# Patient Record
Sex: FEMALE | Race: WHITE
[De-identification: names, ages, dates, MRNs, and addresses within clinical notes are randomized per-mention and may not be internally consistent; named-entity substitution may affect disease eponyms.]

---

## 2018-12-04 ENCOUNTER — HOSPITAL ENCOUNTER (OUTPATIENT)
Dept: HOSPITAL 92 - BICMAMMO | Age: 63
Discharge: HOME | End: 2018-12-04
Attending: FAMILY MEDICINE
Payer: COMMERCIAL

## 2018-12-04 DIAGNOSIS — Z80.3: ICD-10-CM

## 2018-12-04 DIAGNOSIS — Z12.31: Primary | ICD-10-CM

## 2018-12-04 PROCEDURE — 77067 SCR MAMMO BI INCL CAD: CPT

## 2018-12-04 PROCEDURE — 77063 BREAST TOMOSYNTHESIS BI: CPT

## 2019-06-19 NOTE — CT
CT ABDOMEN AND PELVIS WITH ORAL AND IV CONTRAST:



HISTORY: Diverticulitis



COMPARISON: 11/7/2017



FINDINGS:

Mild dependent changes in the lung bases are again seen. The subcentimeter low-density lesion in the 
left lobe of the liver is stable. No calcified gallstones are seen. The spleen, pancreas, adrenal

glands and right kidney are normal. Small low-density lesions in the left kidney are likely cysts.



No free air, free fluid or lymphadenopathy seen in the abdomen or pelvis. There are vascular calcific
ations without evidence of aneurysmal dilatation of the abdominal aorta. The small bowel loops are

not abnormally dilated. No pericolonic inflammatory changes are seen. There is prominence of the wall
 of the rectosigmoid. There is fecal material in the colon. There are degenerative changes in the

spine.



IMPRESSION: Prominence of the wall of the rectosigmoid may be due to wall thickening or incomplete di
stention. 



Reported By: Mike Feliciano 

Electronically Signed:  6/19/2019 1:27 PM

## 2019-12-27 NOTE — MMO
Bilateral MAMMO Bilat Screen [facility].

 

CLINICAL HISTORY:

Patient is 64 years old and is seen for screening. The patient has no family

history of breast cancer.  The patient has no personal history of cancer. The

patient has a history of right Ultrasound Guided Core Biopsy in 2007 - benign.

 

VIEWS:

The views performed were:  bilateral craniocaudal with tomosynthesis and

bilateral mediolateral oblique with tomosynthesis.

 

FILMS COMPARED:

The present examination has been compared to prior imaging studies performed at

[facility] on 11/27/2017 and 12/04/2018, and at [facility] on

12/18/2015 and 12/20/2016.

 

This study has been interpreted with the assistance of computer-aided detection.

 

MAMMOGRAM FINDINGS:

The breasts are heterogeneously dense, which could obscure a lesion on

mammography.

 

There is an area of architectural distortion seen in the MLO view only seen in

the upper region of the left breast.

 

In the right breast, there are no suspicious masses, calcifications or areas of

architectural distortion.

 

IMPRESSION:

AREA OF ARCHITECTURAL DISTORTION IN THE LEFT BREAST REQUIRES ADDITIONAL

EVALUATION. RECOMMEND DIAGNOSTIC MAMMOGRAM.

 

ULTRASOUND MAY ALSO PROVE USEFUL AT RECALL.

 

THE RESULTS OF THIS EXAM WERE SENT TO THE PATIENT.

 

ACR BI-RADS Category 0 - Incomplete:  Need additional imaging evaluation. [facility] will notify the patient of the need for additional imaging services.

 

MAMMOGRAPHY NOTE:

 1. A negative mammogram report should not delay a biopsy if a dominant of

 clinically suspicious mass is present.

 2. Approximately 10% to 15% of breast cancers are not detected by

 mammography.

 3. Adenosis and dense breasts may obscure an underlying neoplasm.

 

 

Reported by: FRANCES MARTIN MD

Electonically Signed: [id number]

## 2020-01-03 NOTE — MMO
Left Breast MAMMO Unilat Diag [facility].

 

CLINICAL HISTORY:

Patient is 64 years old and is seen for diagnostic exam. The patient has no

family history of breast cancer.  The patient has no personal history of cancer.

The patient has a history of right Ultrasound Guided Core Biopsy in 2007 -

benign.

 

VIEWS:

The views performed were:  left mediolateral oblique spot compression with

tomosynthesis and left mediolateral with tomosynthesis.

 

FILMS COMPARED:

The present examination has been compared to prior imaging studies performed at

[facility] on 11/27/2017, 12/04/2018 and 12/27/2019, and at [facility] on 12/20/2016.

 

This study has been interpreted with the assistance of computer-aided detection.

 

MAMMOGRAM FINDINGS:

The breast is heterogeneously dense, which could obscure a lesion on mammography.

 

Additional evaluation was performed for the area of architectural distortion in

the left breast, upper region seen on 12/27/2019.  On the present examination,

area of architectural distortion in the left breast, upper region does not

persist.

 

There are no suspicious masses, suspicious calcifications, or new areas of

architectural distortion.

 

IMPRESSION:

THERE IS NO MAMMOGRAPHIC EVIDENCE OF MALIGNANCY.

 

THE FINDINGS AND RECOMMENDATIONS WERE DISCUSSED WITH THE PATIENT PRIOR TO HER

LEAVING THE CENTER.

 

A ROUTINE FOLLOW-UP MAMMOGRAM IN 1 YEAR IS RECOMMENDED.

 

THE RESULTS OF THIS EXAM WERE SENT TO THE PATIENT.

 

ACR BI-RADS Category 2 - Benign finding

 

MAMMOGRAPHY NOTE:

 1. A negative mammogram report should not delay a biopsy if a dominant of

 clinically suspicious mass is present.

 2. Approximately 10% to 15% of breast cancers are not detected by

 mammography.

 3. Adenosis and dense breasts may obscure an underlying neoplasm.

 

 

Reported by: NOE BROWER MD

Electonically Signed: [id number]

## 2022-01-27 ENCOUNTER — HOSPITAL ENCOUNTER (OUTPATIENT)
Dept: HOSPITAL 92 - BICMAMMO | Age: 67
Discharge: HOME | End: 2022-01-27
Attending: FAMILY MEDICINE
Payer: MEDICARE

## 2022-01-27 DIAGNOSIS — Z80.3: ICD-10-CM

## 2022-01-27 DIAGNOSIS — Z12.31: Primary | ICD-10-CM

## 2022-01-27 PROCEDURE — 77067 SCR MAMMO BI INCL CAD: CPT

## 2022-01-27 PROCEDURE — 77063 BREAST TOMOSYNTHESIS BI: CPT

## 2022-03-01 ENCOUNTER — HOSPITAL ENCOUNTER (OUTPATIENT)
Dept: HOSPITAL 92 - CSHMAMMO | Age: 67
Discharge: HOME | End: 2022-03-01
Attending: OBSTETRICS & GYNECOLOGY
Payer: MEDICARE

## 2022-03-01 DIAGNOSIS — M85.859: ICD-10-CM

## 2022-03-01 DIAGNOSIS — Z13.820: Primary | ICD-10-CM

## 2022-03-01 PROCEDURE — 77080 DXA BONE DENSITY AXIAL: CPT

## 2022-03-08 ENCOUNTER — HOSPITAL ENCOUNTER (OUTPATIENT)
Dept: HOSPITAL 92 - CSHCT | Age: 67
Discharge: HOME | End: 2022-03-08
Attending: OTOLARYNGOLOGY
Payer: MEDICARE

## 2022-03-08 DIAGNOSIS — J39.2: Primary | ICD-10-CM

## 2022-03-08 LAB — ESTIMATED GFR-MDRD - POC: 84

## 2022-03-08 PROCEDURE — 70491 CT SOFT TISSUE NECK W/DYE: CPT

## 2022-03-08 PROCEDURE — 82565 ASSAY OF CREATININE: CPT

## 2022-06-21 ENCOUNTER — HOSPITAL ENCOUNTER (OUTPATIENT)
Dept: HOSPITAL 92 - BICMRI | Age: 67
Discharge: HOME | End: 2022-06-21
Attending: NEUROLOGICAL SURGERY
Payer: MEDICARE

## 2022-06-21 DIAGNOSIS — M47.16: Primary | ICD-10-CM

## 2022-06-21 DIAGNOSIS — M50.321: ICD-10-CM

## 2022-06-21 DIAGNOSIS — M48.061: ICD-10-CM

## 2022-06-21 PROCEDURE — 72141 MRI NECK SPINE W/O DYE: CPT

## 2022-06-21 PROCEDURE — 72148 MRI LUMBAR SPINE W/O DYE: CPT

## 2023-02-07 ENCOUNTER — HOSPITAL ENCOUNTER (OUTPATIENT)
Dept: HOSPITAL 92 - CSHMAMMO | Age: 68
Discharge: HOME | End: 2023-02-07
Attending: FAMILY MEDICINE
Payer: MEDICARE

## 2023-02-07 DIAGNOSIS — Z91.89: ICD-10-CM

## 2023-02-07 DIAGNOSIS — Z80.3: ICD-10-CM

## 2023-02-07 DIAGNOSIS — R92.1: ICD-10-CM

## 2023-02-07 DIAGNOSIS — Z12.31: Primary | ICD-10-CM

## 2023-02-07 PROCEDURE — 77067 SCR MAMMO BI INCL CAD: CPT

## 2023-02-07 PROCEDURE — 77063 BREAST TOMOSYNTHESIS BI: CPT

## 2023-04-28 ENCOUNTER — HOSPITAL ENCOUNTER (OUTPATIENT)
Dept: HOSPITAL 92 - ERS | Age: 68
Setting detail: OBSERVATION
LOS: 1 days | Discharge: HOME | End: 2023-04-29
Attending: INTERNAL MEDICINE | Admitting: INTERNAL MEDICINE
Payer: MEDICARE

## 2023-04-28 VITALS — BODY MASS INDEX: 22.1 KG/M2

## 2023-04-28 DIAGNOSIS — I08.3: ICD-10-CM

## 2023-04-28 DIAGNOSIS — R42: Primary | ICD-10-CM

## 2023-04-28 DIAGNOSIS — E78.5: ICD-10-CM

## 2023-04-28 DIAGNOSIS — Z79.82: ICD-10-CM

## 2023-04-28 DIAGNOSIS — F10.90: ICD-10-CM

## 2023-04-28 DIAGNOSIS — I49.3: ICD-10-CM

## 2023-04-28 DIAGNOSIS — R53.1: ICD-10-CM

## 2023-04-28 LAB
ALBUMIN SERPL BCG-MCNC: 4.4 G/DL (ref 3.4–4.8)
ALP SERPL-CCNC: 70 U/L (ref 40–110)
ALT SERPL W P-5'-P-CCNC: 25 U/L (ref 8–55)
ANION GAP SERPL CALC-SCNC: 16 MMOL/L (ref 10–20)
APTT PPP: 29.8 SEC (ref 22.9–36.1)
AST SERPL-CCNC: 34 U/L (ref 5–34)
BASOPHILS # BLD AUTO: 0.1 THOU/UL (ref 0–0.2)
BASOPHILS NFR BLD AUTO: 1 % (ref 0–1)
BILIRUB SERPL-MCNC: 0.4 MG/DL (ref 0.2–1.2)
BUN SERPL-MCNC: 16 MG/DL (ref 9.8–20.1)
CALCIUM SERPL-MCNC: 9.5 MG/DL (ref 7.8–10.44)
CHLORIDE SERPL-SCNC: 107 MMOL/L (ref 98–107)
CO2 SERPL-SCNC: 22 MMOL/L (ref 23–31)
CREAT CL PREDICTED SERPL C-G-VRATE: 0 ML/MIN (ref 70–130)
EOSINOPHIL # BLD AUTO: 0.1 THOU/UL (ref 0–0.7)
EOSINOPHIL NFR BLD AUTO: 1 % (ref 0–10)
GLOBULIN SER CALC-MCNC: 2.5 G/DL (ref 2.4–3.5)
GLUCOSE SERPL-MCNC: 101 MG/DL (ref 80–115)
HGB BLD-MCNC: 14.3 G/DL (ref 12–16)
INR PPP: 0.9
LYMPHOCYTES # BLD: 1.4 THOU/UL (ref 1.2–3.4)
LYMPHOCYTES NFR BLD AUTO: 21 % (ref 21–51)
MCH RBC QN AUTO: 33.4 PG (ref 27–31)
MCV RBC AUTO: 96.8 FL (ref 78–98)
MONOCYTES # BLD AUTO: 0.6 THOU/UL (ref 0.11–0.59)
MONOCYTES NFR BLD AUTO: 8.7 % (ref 0–10)
NEUTROPHILS # BLD AUTO: 4.5 THOU/UL (ref 1.4–6.5)
NEUTROPHILS NFR BLD AUTO: 68.5 % (ref 42–75)
PLATELET # BLD AUTO: 245 10X3/UL (ref 130–400)
POTASSIUM SERPL-SCNC: 4.5 MMOL/L (ref 3.5–5.1)
PROTHROMBIN TIME: 12.9 SEC (ref 12–14.7)
RBC # BLD AUTO: 4.28 MILL/UL (ref 4.2–5.4)
SODIUM SERPL-SCNC: 140 MMOL/L (ref 136–145)
WBC # BLD AUTO: 6.5 10X3/UL (ref 4.8–10.8)

## 2023-04-28 PROCEDURE — 83036 HEMOGLOBIN GLYCOSYLATED A1C: CPT

## 2023-04-28 PROCEDURE — 36415 COLL VENOUS BLD VENIPUNCTURE: CPT

## 2023-04-28 PROCEDURE — 71045 X-RAY EXAM CHEST 1 VIEW: CPT

## 2023-04-28 PROCEDURE — 80061 LIPID PANEL: CPT

## 2023-04-28 PROCEDURE — 93306 TTE W/DOPPLER COMPLETE: CPT

## 2023-04-28 PROCEDURE — 80048 BASIC METABOLIC PNL TOTAL CA: CPT

## 2023-04-28 PROCEDURE — G0378 HOSPITAL OBSERVATION PER HR: HCPCS

## 2023-04-28 PROCEDURE — 94760 N-INVAS EAR/PLS OXIMETRY 1: CPT

## 2023-04-28 PROCEDURE — 85025 COMPLETE CBC W/AUTO DIFF WBC: CPT

## 2023-04-28 PROCEDURE — 85730 THROMBOPLASTIN TIME PARTIAL: CPT

## 2023-04-28 PROCEDURE — 99285 EMERGENCY DEPT VISIT HI MDM: CPT

## 2023-04-28 PROCEDURE — 70450 CT HEAD/BRAIN W/O DYE: CPT

## 2023-04-28 PROCEDURE — 85610 PROTHROMBIN TIME: CPT

## 2023-04-28 PROCEDURE — 70498 CT ANGIOGRAPHY NECK: CPT

## 2023-04-28 PROCEDURE — 93005 ELECTROCARDIOGRAM TRACING: CPT

## 2023-04-28 PROCEDURE — 84484 ASSAY OF TROPONIN QUANT: CPT

## 2023-04-28 PROCEDURE — 70496 CT ANGIOGRAPHY HEAD: CPT

## 2023-04-28 PROCEDURE — 84443 ASSAY THYROID STIM HORMONE: CPT

## 2023-04-28 PROCEDURE — 96372 THER/PROPH/DIAG INJ SC/IM: CPT

## 2023-04-28 PROCEDURE — 80053 COMPREHEN METABOLIC PANEL: CPT

## 2023-04-28 PROCEDURE — 70551 MRI BRAIN STEM W/O DYE: CPT

## 2023-04-29 VITALS — DIASTOLIC BLOOD PRESSURE: 76 MMHG | TEMPERATURE: 97.3 F | SYSTOLIC BLOOD PRESSURE: 134 MMHG

## 2023-04-29 LAB
ANION GAP SERPL CALC-SCNC: 10 MMOL/L (ref 10–20)
BASOPHILS # BLD AUTO: 0 THOU/UL (ref 0–0.2)
BASOPHILS NFR BLD AUTO: 1 % (ref 0–1)
BUN SERPL-MCNC: 13 MG/DL (ref 9.8–20.1)
CALCIUM SERPL-MCNC: 9.4 MG/DL (ref 7.8–10.44)
CHD RISK SERPL-RTO: 2.6 (ref ?–4.5)
CHLORIDE SERPL-SCNC: 107 MMOL/L (ref 98–107)
CHOLEST SERPL-MCNC: 208 MG/DL
CO2 SERPL-SCNC: 28 MMOL/L (ref 23–31)
CREAT CL PREDICTED SERPL C-G-VRATE: 82 ML/MIN (ref 70–130)
EOSINOPHIL # BLD AUTO: 0.1 THOU/UL (ref 0–0.7)
EOSINOPHIL NFR BLD AUTO: 2.7 % (ref 0–10)
GLUCOSE SERPL-MCNC: 102 MG/DL (ref 80–115)
HDLC SERPL-MCNC: 81 MG/DL
HGB BLD-MCNC: 13.8 G/DL (ref 12–16)
LDLC SERPL CALC-MCNC: 116 MG/DL
LYMPHOCYTES # BLD: 1.9 THOU/UL (ref 1.2–3.4)
LYMPHOCYTES NFR BLD AUTO: 41.6 % (ref 21–51)
MCH RBC QN AUTO: 34.1 PG (ref 27–31)
MCV RBC AUTO: 96.8 FL (ref 78–98)
MONOCYTES # BLD AUTO: 0.5 THOU/UL (ref 0.11–0.59)
MONOCYTES NFR BLD AUTO: 10.8 % (ref 0–10)
NEUTROPHILS # BLD AUTO: 2 THOU/UL (ref 1.4–6.5)
NEUTROPHILS NFR BLD AUTO: 43.8 % (ref 42–75)
PLATELET # BLD AUTO: 221 10X3/UL (ref 130–400)
POTASSIUM SERPL-SCNC: 4.2 MMOL/L (ref 3.5–5.1)
RBC # BLD AUTO: 4.05 MILL/UL (ref 4.2–5.4)
SODIUM SERPL-SCNC: 141 MMOL/L (ref 136–145)
TRIGL SERPL-MCNC: 53 MG/DL (ref ?–150)
WBC # BLD AUTO: 4.5 10X3/UL (ref 4.8–10.8)